# Patient Record
Sex: FEMALE
[De-identification: names, ages, dates, MRNs, and addresses within clinical notes are randomized per-mention and may not be internally consistent; named-entity substitution may affect disease eponyms.]

---

## 2024-01-26 ENCOUNTER — LABORATORY RESULT (OUTPATIENT)
Age: 24
End: 2024-01-26

## 2024-01-26 ENCOUNTER — APPOINTMENT (OUTPATIENT)
Dept: OBGYN | Facility: CLINIC | Age: 24
End: 2024-01-26
Payer: COMMERCIAL

## 2024-01-26 VITALS
SYSTOLIC BLOOD PRESSURE: 112 MMHG | WEIGHT: 106 LBS | HEIGHT: 61 IN | DIASTOLIC BLOOD PRESSURE: 60 MMHG | BODY MASS INDEX: 20.01 KG/M2

## 2024-01-26 DIAGNOSIS — N76.0 ACUTE VAGINITIS: ICD-10-CM

## 2024-01-26 DIAGNOSIS — R87.619 UNSPECIFIED ABNORMAL CYTOLOGICAL FINDINGS IN SPECIMENS FROM CERVIX UTERI: ICD-10-CM

## 2024-01-26 DIAGNOSIS — Z11.3 ENCOUNTER FOR SCREENING FOR INFECTIONS WITH A PREDOMINANTLY SEXUAL MODE OF TRANSMISSION: ICD-10-CM

## 2024-01-26 PROCEDURE — 99203 OFFICE O/P NEW LOW 30 MIN: CPT

## 2024-01-26 NOTE — PHYSICAL EXAM
[Alert] : alert [Appropriately responsive] : appropriately responsive [No Acute Distress] : no acute distress [No Lymphadenopathy] : no lymphadenopathy [Soft] : soft [Non-tender] : non-tender [Oriented x3] : oriented x3 [Examination Of The Breasts] : a normal appearance [No Masses] : no breast masses were palpable [Labia Majora] : normal [Labia Minora] : normal [IUD String] : an IUD string was noted [Normal] : normal [Uterine Adnexae] : normal [FreeTextEntry4] : affirm taken [FreeTextEntry5] : Culture taken

## 2024-01-26 NOTE — HISTORY OF PRESENT ILLNESS
[FreeTextEntry1] : 24yo G0 female with an LMP of 2019 (Kyleena 2019) presents today to initiate care Would like to discuss Kyleen and HPV Reports recently completed meds for yeast infection Denies any discharge or odor currently.  Sexually active with one male partner, denies condom use  Menstrual triad: 11 x 28 x7  Gyn: Completed Gardasil  11/2023 LSGIL/ unsure HPV? 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

## 2024-01-26 NOTE — DISCUSSION/SUMMARY
[FreeTextEntry1] : Abnormal pap Noted 11/2023 LGSIL, no result for HPV noted, pt will contact previous GYN office Discussed cytology and HPV in detail Discussed ASCCP guidelines  Vaginitis -Completed meds for candida vaginitis -Follow up on affirm -Culture taken (G/C)  IUD contraception -Strings visualized -Kyleena (2019) - She is due for replacement in 4/2024, will return for replacement

## 2024-01-30 LAB
C TRACH RRNA SPEC QL NAA+PROBE: NOT DETECTED
CANDIDA VAG CYTO: NOT DETECTED
G VAGINALIS+PREV SP MTYP VAG QL MICRO: DETECTED
N GONORRHOEA RRNA SPEC QL NAA+PROBE: NOT DETECTED
SOURCE AMPLIFICATION: NORMAL
T VAGINALIS VAG QL WET PREP: NOT DETECTED

## 2024-01-30 RX ORDER — METRONIDAZOLE 7.5 MG/G
0.75 GEL VAGINAL
Qty: 1 | Refills: 0 | Status: ACTIVE | COMMUNITY
Start: 2024-01-30 | End: 1900-01-01

## 2024-04-26 ENCOUNTER — APPOINTMENT (OUTPATIENT)
Dept: OBGYN | Facility: CLINIC | Age: 24
End: 2024-04-26
Payer: COMMERCIAL

## 2024-04-26 VITALS
HEIGHT: 59 IN | SYSTOLIC BLOOD PRESSURE: 106 MMHG | WEIGHT: 105 LBS | BODY MASS INDEX: 21.17 KG/M2 | DIASTOLIC BLOOD PRESSURE: 66 MMHG | RESPIRATION RATE: 14 BRPM

## 2024-04-26 DIAGNOSIS — Z30.430 ENCOUNTER FOR INSERTION OF INTRAUTERINE CONTRACEPTIVE DEVICE: ICD-10-CM

## 2024-04-26 DIAGNOSIS — Z32.02 ENCOUNTER FOR PREGNANCY TEST, RESULT NEGATIVE: ICD-10-CM

## 2024-04-26 DIAGNOSIS — Z30.432 ENCOUNTER FOR REMOVAL OF INTRAUTERINE CONTRACEPTIVE DEVICE: ICD-10-CM

## 2024-04-26 DIAGNOSIS — Z97.5 PRESENCE OF (INTRAUTERINE) CONTRACEPTIVE DEVICE: ICD-10-CM

## 2024-04-26 LAB
HCG UR QL: NEGATIVE
QUALITY CONTROL: YES

## 2024-04-26 PROCEDURE — 58300 INSERT INTRAUTERINE DEVICE: CPT

## 2024-04-26 PROCEDURE — 81025 URINE PREGNANCY TEST: CPT

## 2024-04-26 PROCEDURE — 58301 REMOVE INTRAUTERINE DEVICE: CPT

## 2024-04-26 NOTE — ASSESSMENT
[FreeTextEntry1] : IUD removal  -IUD removed today, pt tolerated procedure well  IUD insertion -Kyleena IUD inserted today, pt tolerated procedure well, -LOT [id number]  -Exp 2/2023 - Risks of hormonal birth control reviewed, including but not limited to infection, migration, abnormal  bleeding. . All questions answered. Pt understands & accepts risks. Instructions/warning signs given. -Advised f/u in 6 weeks.

## 2024-04-26 NOTE — PROCEDURE
[IUD Placement] : intrauterine device (IUD) placement [Time out performed] : Pre-procedure time out performed.  Patient's name, date of birth and procedure confirmed. [Consent Obtained] : Consent obtained [Neg Pregnancy Test] : negative pregnancy test [Ibuprofen ___ mg] : ibuprofen [unfilled] ~Umg [Tenaculum] : Tenaculum [Easy Passage] : Easy passage [Kyleena IUD] : Kyleena IUD [None] : None [IUD Removal] : intrauterine device (IUD) removal [ IUD] :  IUD [Risks] : risks [Benefits] : benefits [Alternatives] : alternatives [Speculum Placed] : speculum placed [Strings Visualized] : strings visualized [IUD Discarded] : IUD discarded [Tolerated Well] : Patient tolerated the procedure well [No Complications] : no complications [de-identified] : [id number]  [de-identified] : 2/2023 [de-identified] : 4/2029 [de-identified] : Strings grasped with ringed forceps

## 2024-06-19 ENCOUNTER — APPOINTMENT (OUTPATIENT)
Dept: OBGYN | Facility: CLINIC | Age: 24
End: 2024-06-19

## 2024-09-02 ENCOUNTER — NON-APPOINTMENT (OUTPATIENT)
Age: 24
End: 2024-09-02